# Patient Record
Sex: MALE | Race: WHITE | NOT HISPANIC OR LATINO
[De-identification: names, ages, dates, MRNs, and addresses within clinical notes are randomized per-mention and may not be internally consistent; named-entity substitution may affect disease eponyms.]

---

## 2021-09-15 PROBLEM — Z00.00 ENCOUNTER FOR PREVENTIVE HEALTH EXAMINATION: Status: ACTIVE | Noted: 2021-09-15

## 2021-09-23 ENCOUNTER — APPOINTMENT (OUTPATIENT)
Dept: SURGERY | Facility: CLINIC | Age: 51
End: 2021-09-23
Payer: MEDICAID

## 2021-09-23 VITALS — HEIGHT: 67 IN | BODY MASS INDEX: 31.39 KG/M2 | WEIGHT: 200 LBS

## 2021-09-23 PROCEDURE — 99203 OFFICE O/P NEW LOW 30 MIN: CPT

## 2021-09-23 NOTE — CONSULT LETTER
[Dear  ___] : Dear  [unfilled], [Courtesy Letter:] : I had the pleasure of seeing your patient, [unfilled], in my office today. [Please see my note below.] : Please see my note below. [Consult Closing:] : Thank you very much for allowing me to participate in the care of this patient.  If you have any questions, please do not hesitate to contact me. [DrCindy  ___] : Dr. PEREZ [FreeTextEntry3] : Respectfully,\par \par Damon Izquierdo M.D., FACS\par

## 2021-09-23 NOTE — PHYSICAL EXAM
[Normal Breath Sounds] : Normal breath sounds [No Rash or Lesion] : No rash or lesion [Alert] : alert [Calm] : calm [JVD] : no jugular venous distention  [de-identified] : overweight [de-identified] : normal [de-identified] : moderately protuberant abdomen, mild diastasis recti\par  [de-identified] : umbilical hernia, reducible

## 2021-09-23 NOTE — ASSESSMENT
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 He enjoys exercising and lifting weights at the gym. He presented today with his son who acted as , but he wasn't really necessary as Rodo and I were able to converse easily together.\par \par Physical examination demonstrates a strawberry size very tender bulge at the umbilicus which is reducible with a significant degree of difficulty consistent with a large symptomatic protruding umbilical hernia warranting surgical repair. There is no evidence of incarceration or strangulation, and the patient denies any symptoms of obstruction.  This hernia is complicated by a mild to moderate diastasis recti which is likely related to his excess abdominal weight. His current BMI is 31.\par \par I explained the pros and cons of surgery, as well as all risks, benefits, indications and alternatives of the procedure and the patient understood and agreed. Rodo was scheduled for the repair of his umbilical hernia with mesh on Friday, November 5, 2021 under LOCAL with IV SEDATION at [facility] with presurgical testing waived.  He was encouraged to avoid heavy lifting and strenuous activity in the interim, of course. He will hold his Humira 2 weeks prior to his procedure and may resume it 2 weeks thereafter at the direction of his rheumatologist.

## 2021-11-02 ENCOUNTER — OUTPATIENT (OUTPATIENT)
Dept: OUTPATIENT SERVICES | Facility: HOSPITAL | Age: 51
LOS: 1 days | Discharge: HOME | End: 2021-11-02

## 2021-11-02 ENCOUNTER — LABORATORY RESULT (OUTPATIENT)
Age: 51
End: 2021-11-02

## 2021-11-02 DIAGNOSIS — Z11.59 ENCOUNTER FOR SCREENING FOR OTHER VIRAL DISEASES: ICD-10-CM

## 2021-11-05 ENCOUNTER — APPOINTMENT (OUTPATIENT)
Dept: SURGERY | Facility: AMBULATORY SURGERY CENTER | Age: 51
End: 2021-11-05
Payer: MEDICAID

## 2021-11-05 ENCOUNTER — OUTPATIENT (OUTPATIENT)
Dept: OUTPATIENT SERVICES | Facility: HOSPITAL | Age: 51
LOS: 1 days | Discharge: HOME | End: 2021-11-05

## 2021-11-05 VITALS
DIASTOLIC BLOOD PRESSURE: 85 MMHG | WEIGHT: 199.96 LBS | SYSTOLIC BLOOD PRESSURE: 124 MMHG | HEIGHT: 67 IN | HEART RATE: 86 BPM | OXYGEN SATURATION: 100 % | TEMPERATURE: 98 F | RESPIRATION RATE: 18 BRPM

## 2021-11-05 VITALS
RESPIRATION RATE: 28 BRPM | OXYGEN SATURATION: 96 % | HEART RATE: 94 BPM | SYSTOLIC BLOOD PRESSURE: 143 MMHG | DIASTOLIC BLOOD PRESSURE: 100 MMHG

## 2021-11-05 DIAGNOSIS — Z98.890 OTHER SPECIFIED POSTPROCEDURAL STATES: Chronic | ICD-10-CM

## 2021-11-05 PROCEDURE — 49585: CPT

## 2021-11-05 RX ORDER — ONDANSETRON 8 MG/1
4 TABLET, FILM COATED ORAL ONCE
Refills: 0 | Status: DISCONTINUED | OUTPATIENT
Start: 2021-11-05 | End: 2021-11-19

## 2021-11-05 RX ORDER — ACETAMINOPHEN 500 MG
1000 TABLET ORAL ONCE
Refills: 0 | Status: COMPLETED | OUTPATIENT
Start: 2021-11-05 | End: 2021-11-05

## 2021-11-05 RX ORDER — CELECOXIB 200 MG/1
200 CAPSULE ORAL ONCE
Refills: 0 | Status: COMPLETED | OUTPATIENT
Start: 2021-11-05 | End: 2021-11-05

## 2021-11-05 RX ORDER — SODIUM CHLORIDE 9 MG/ML
1000 INJECTION, SOLUTION INTRAVENOUS
Refills: 0 | Status: DISCONTINUED | OUTPATIENT
Start: 2021-11-05 | End: 2021-11-19

## 2021-11-05 RX ORDER — HYDROMORPHONE HYDROCHLORIDE 2 MG/ML
1 INJECTION INTRAMUSCULAR; INTRAVENOUS; SUBCUTANEOUS
Refills: 0 | Status: DISCONTINUED | OUTPATIENT
Start: 2021-11-05 | End: 2021-11-05

## 2021-11-05 RX ORDER — ADALIMUMAB 40MG/0.8ML
40 KIT SUBCUTANEOUS
Qty: 0 | Refills: 0 | DISCHARGE

## 2021-11-05 RX ORDER — HYDROMORPHONE HYDROCHLORIDE 2 MG/ML
0.5 INJECTION INTRAMUSCULAR; INTRAVENOUS; SUBCUTANEOUS
Refills: 0 | Status: DISCONTINUED | OUTPATIENT
Start: 2021-11-05 | End: 2021-11-05

## 2021-11-05 RX ORDER — TRAMADOL HYDROCHLORIDE 50 MG/1
1 TABLET ORAL
Qty: 30 | Refills: 0
Start: 2021-11-05 | End: 2021-11-09

## 2021-11-05 RX ORDER — OXYCODONE HYDROCHLORIDE 5 MG/1
5 TABLET ORAL ONCE
Refills: 0 | Status: DISCONTINUED | OUTPATIENT
Start: 2021-11-05 | End: 2021-11-05

## 2021-11-05 RX ADMIN — Medication 1000 MILLIGRAM(S): at 10:55

## 2021-11-05 RX ADMIN — SODIUM CHLORIDE 100 MILLILITER(S): 9 INJECTION, SOLUTION INTRAVENOUS at 12:48

## 2021-11-05 RX ADMIN — CELECOXIB 200 MILLIGRAM(S): 200 CAPSULE ORAL at 10:56

## 2021-11-05 NOTE — ASU PREOP CHECKLIST - SURGICAL CONSENT
verified with  # [id number] that the patient is aware of surgery, and consent was signed in the office with son present./done

## 2021-11-05 NOTE — CHART NOTE - NSCHARTNOTEFT_GEN_A_CORE
PACU ANESTHESIA ADMISSION NOTE      Procedure: Umbilical hernia repair with mesh      Post op diagnosis:  Umbilical hernia        ____  Intubated  TV:______       Rate: ______      FiO2: ______    __x__  Patent Airway    __x__  Full return of protective reflexes    __x__  Full recovery from anesthesia / back to baseline status    Vitals:  T(C): 36.9 (11-05-21 @ 10:25), Max: 36.9 (11-05-21 @ 10:25)  HR: 86 (11-05-21 @ 10:25) (86 - 86)  BP: 124/85 (11-05-21 @ 10:25) (124/85 - 124/85)  RR: 18 (11-05-21 @ 10:25) (18 - 18)  SpO2: 100% (11-05-21 @ 10:25) (100% - 100%)    Mental Status:  __x__ Awake   ___x__ Alert   _____ Drowsy   _____ Sedated    Nausea/Vomiting:  __x__ NO  ______Yes,   See Post - Op Orders          Pain Scale (0-10):  _0____    Treatment: ____ None    __x__ See Post - Op/PCA Orders    Post - Operative Fluids:   ____ Oral   __x__ See Post - Op Orders    Plan: Discharge:   ___x_Home       _____Floor     _____Critical Care    _____  Other:_________________    Comments: Patient had smooth intraoperative event, no anesthesia complication.  PACU Vital signs: HR: 92           BP:   118     /  75        RR:  16           O2 Sat: 97      %     Temp 36

## 2021-11-05 NOTE — ASU DISCHARGE PLAN (ADULT/PEDIATRIC) - CARE PROVIDER_API CALL
Damon Izquierdo)  Surgery  [address]  [place] [address]  Phone: [phone]  Fax: [phone]  Scheduled Appointment: 11/16/2021

## 2021-11-11 DIAGNOSIS — K42.9 UMBILICAL HERNIA WITHOUT OBSTRUCTION OR GANGRENE: ICD-10-CM

## 2021-11-16 ENCOUNTER — APPOINTMENT (OUTPATIENT)
Dept: SURGERY | Facility: CLINIC | Age: 51
End: 2021-11-16
Payer: MEDICAID

## 2021-11-16 VITALS — WEIGHT: 210 LBS | HEIGHT: 67 IN | BODY MASS INDEX: 32.96 KG/M2

## 2021-11-16 DIAGNOSIS — K42.9 UMBILICAL HERNIA W/OUT OBSTRUCTION OR GANGRENE: ICD-10-CM

## 2021-11-16 PROCEDURE — 99024 POSTOP FOLLOW-UP VISIT: CPT

## 2021-11-16 RX ORDER — SULFAMETHOXAZOLE AND TRIMETHOPRIM 800; 160 MG/1; MG/1
800-160 TABLET ORAL
Qty: 14 | Refills: 1 | Status: ACTIVE | COMMUNITY
Start: 2021-11-16 | End: 1900-01-01

## 2021-11-16 NOTE — ASSESSMENT
[FreeTextEntry1] : Rodo underwent the repair of his large umbilical hernia with mesh on November 5, 2021 under local with IV sedation without any problems or complications. His wound is clean, dry and intact. There is no evidence of seroma formation or infection. He does have some very mild erythema in the umbilical stalk. He denies any fevers or chills and looks well. He is tolerating a diet and having normal bowel movements. He denies any significant postoperative pain or discomfort at this time.\par \par He was counseled and reassured. Rodo was discharged from the office with no specific followup necessary, but he knows to avoid any heavy lifting or strenuous activity for the next several weeks.  We also discussed the importance of calorie restriction and healthy eating with regard to weight loss, hernia recurrence and his overall health.He was encouraged to continue to wear his abdominal binder for the better part of the next month. \par \par In light of his perioperative Humira use and his very mild nontender erythema in the umbilical stalk itself, I prescribed one week of Bactrim DS  twice daily empirically. I will call him in 2 weeks to check on him and encouraged him to call me in the interim if any issues arise.

## 2021-11-16 NOTE — CONSULT LETTER
[FreeTextEntry1] : Dear Dr. Fadia Ochoa, \par \par I had the pleasure of seeing your patient, ESTEFANY HOWARD, in my office today. Please see my note below. \par \par Thank you very much for allowing me to participate in the care of this patient. If you have any questions, please do not hesitate to contact me. \par \par \par Respectfully,\par \par Damon Izquierdo M.D., FACS\par  \par \par \par \par cc: Dr. Ann Resendiz

## 2021-12-02 ENCOUNTER — NON-APPOINTMENT (OUTPATIENT)
Age: 51
End: 2021-12-02

## 2024-03-01 PROBLEM — M06.9 RHEUMATOID ARTHRITIS, UNSPECIFIED: Chronic | Status: ACTIVE | Noted: 2021-11-05

## 2024-03-22 ENCOUNTER — APPOINTMENT (OUTPATIENT)
Dept: CARDIOLOGY | Facility: CLINIC | Age: 54
End: 2024-03-22

## 2024-08-01 ENCOUNTER — EMERGENCY (EMERGENCY)
Facility: HOSPITAL | Age: 54
LOS: 0 days | Discharge: HOME | End: 2024-08-01
Attending: STUDENT IN AN ORGANIZED HEALTH CARE EDUCATION/TRAINING PROGRAM
Payer: MEDICAID

## 2024-08-01 VITALS
SYSTOLIC BLOOD PRESSURE: 122 MMHG | OXYGEN SATURATION: 97 % | RESPIRATION RATE: 16 BRPM | HEART RATE: 86 BPM | DIASTOLIC BLOOD PRESSURE: 73 MMHG

## 2024-08-01 VITALS
DIASTOLIC BLOOD PRESSURE: 69 MMHG | RESPIRATION RATE: 16 BRPM | TEMPERATURE: 98 F | WEIGHT: 195.11 LBS | HEIGHT: 67 IN | HEART RATE: 96 BPM | SYSTOLIC BLOOD PRESSURE: 128 MMHG | OXYGEN SATURATION: 97 %

## 2024-08-01 DIAGNOSIS — M06.9 RHEUMATOID ARTHRITIS, UNSPECIFIED: ICD-10-CM

## 2024-08-01 DIAGNOSIS — Z91.09 OTHER ALLERGY STATUS, OTHER THAN TO DRUGS AND BIOLOGICAL SUBSTANCES: ICD-10-CM

## 2024-08-01 DIAGNOSIS — L53.8 OTHER SPECIFIED ERYTHEMATOUS CONDITIONS: ICD-10-CM

## 2024-08-01 DIAGNOSIS — I83.891 VARICOSE VEINS OF RIGHT LOWER EXTREMITY WITH OTHER COMPLICATIONS: ICD-10-CM

## 2024-08-01 DIAGNOSIS — R60.0 LOCALIZED EDEMA: ICD-10-CM

## 2024-08-01 DIAGNOSIS — Z98.890 OTHER SPECIFIED POSTPROCEDURAL STATES: Chronic | ICD-10-CM

## 2024-08-01 DIAGNOSIS — M45.9 ANKYLOSING SPONDYLITIS OF UNSPECIFIED SITES IN SPINE: ICD-10-CM

## 2024-08-01 PROCEDURE — 93970 EXTREMITY STUDY: CPT | Mod: 26

## 2024-08-01 PROCEDURE — 93970 EXTREMITY STUDY: CPT

## 2024-08-01 PROCEDURE — 73590 X-RAY EXAM OF LOWER LEG: CPT | Mod: 26,RT

## 2024-08-01 PROCEDURE — 99285 EMERGENCY DEPT VISIT HI MDM: CPT

## 2024-08-01 PROCEDURE — 99284 EMERGENCY DEPT VISIT MOD MDM: CPT | Mod: 25

## 2024-08-01 PROCEDURE — 73590 X-RAY EXAM OF LOWER LEG: CPT | Mod: RT

## 2024-08-01 NOTE — ED PROVIDER NOTE - NSFOLLOWUPINSTRUCTIONS_ED_ALL_ED_FT
Insect Bite    An insect bite can make your skin red, itchy, and swollen. An insect bite is different from an insect sting, which happens when an insect injects poison (venom) into the skin.    Some insects can spread disease to people through a bite. However, most insect bites do not lead to disease and are not serious.    What are the causes?  Insects may bite for a variety of reasons, including:  - Hunger.  - To defend themselves.    Insects that bite include:  - Spiders.  - Mosquitoes and flies.  - Ticks and fleas.  - Ants.  - Kissing bugs.  - Chiggers.    What are the signs or symptoms?  In many cases, symptoms last for 2–4 days. However, itching can last up to 10 days. Symptoms include:  - Itching or pain in the bite area.  - Redness and swelling in the bite area.  - An open wound (skin ulcer).    In rare cases, a person may have a severe allergic reaction (anaphylactic reaction) to a bite. Symptoms of an anaphylactic reaction may include:  - Feeling warm in the face (flushed). This may include redness.  - Itchy, red, swollen areas of skin (hives).  - Swelling of the eyes, lips, face, mouth, tongue, or throat.  - Wheezing or difficulty breathing, speaking, or swallowing.  - Dizziness, light-headedness, or fainting.  - Abdominal symptoms like cramping, nausea, vomiting, or diarrhea.    How is this diagnosed?  This condition is usually diagnosed based on symptoms and a physical exam. During the exam, your health care provider will look at the bite and ask you what kind of insect bit you.    How is this treated?  Most insect bites are not serious. Symptoms often go away on their own and treatment is not usually needed. When treatment is recommended, it may include:  - Applying ice to the affected area.  - Applying steroid or other anti-itch creams, like calamine lotion, to the bite area.  - Medicines called antihistamines to reduce itching.    You may also need:  - A tetanus shot if you are not up to date.  - Antibiotic cream or an oral antibiotic if the bite becomes infected (this is uncommon).    Follow these instructions at home:    Bite area care    Do not scratch the bite area. It may help to cover the bite area with a bandage or close-fitting clothing.  Keep the bite area clean and dry. Wash it every day with soap and water as told by your health care provider.  Check the bite area every day for signs of infection. Check for:  - More redness, swelling, or pain.  - Fluid or blood.  - Warmth.  - Pus or a bad smell.    Managing pain, itching, and swelling    You may apply cortisone cream, calamine lotion, or a paste made of baking soda and water to the bite area as told by your health care provider.  If directed, put ice on the bite area. To do this:  - Put ice in a plastic bag.  - Place a towel between your skin and the bag.  - Leave the ice on for 20 minutes, 2–3 times a day.  If your skin turns bright red, remove the ice right away to prevent skin damage. The risk of skin damage is higher if you cannot feel pain, heat, or cold.    General instructions    Apply or take over-the-counter and prescription medicine only as told by your health care provider.  If you were prescribed antibiotics, take or apply them as told by your health care provider. Do not stop using the antibiotic even if you start to feel better.    How is this prevented?  To help reduce your risk of insect bites:  When you are outdoors, wear clothing that covers your arms and legs. This is especially important in the early morning and evening.  Use insect repellent. The best insect repellents contain DEET, picaridin, oil of lemon eucalyptus (OLE), or LM1614.  Consider spraying your clothing with a pesticide called permethrin. Permethrin helps prevent insect bites. It works for several weeks and for up to 5–6 clothing washes. Do not apply permethrin directly to the skin.  If your home windows do not have screens, consider installing them.  If you will be sleeping in an area where there are mosquitoes, consider covering your sleeping area with a mosquito net.    Contact a health care provider if:  - Your bite area has signs of infection, such as:  - More redness, swelling, or pain.  - Fluid or blood.  - Warmth.  - Pus or a bad smell.  - You have a fever.    Get help right away if:  - You have a rash.  - You have muscle or joint pain.  - You feel unusually tired or weak.  - You have neck pain or a headache.  - You develop symptoms of an anaphylactic reaction. These may include:      - Swelling of the eyes, lips, face, mouth, tongue, or throat.      - Flushed skin or hives.      - Wheezing.      - Difficulty breathing, speaking, or swallowing.      - Dizziness, light-headedness, or fainting.      - Abdominal pain, cramping, vomiting, or diarrhea.  These symptoms may be an emergency. Get help right away. Call 911.  Do not wait to see if the symptoms will go away.  Do not drive yourself to the hospital.    Summary    An insect bite can make your skin red, itchy, and swollen.  Treatment is usually not needed. Symptoms often go away on their own. When treatment is recommended, it may involve taking medicine, applying medicine to the area, or applying ice.  Apply or take over-the-counter and prescription medicines only as told by your health care provider.  Use insect repellent to help prevent insect bites.  Contact a health care provider if your bite area has signs of infection.  This information is not intended to replace advice given to you by your health care provider. Make sure you discuss any questions you have with your health care provider. Our Emergency Department Referral Coordinators will be reaching out to you in the next 24-48 hours from 9:00am to 5:00pm to schedule a follow up appointment. Please expect a phone call from the hospital in that time frame. If you do not receive a call or if you have any questions or concerns, you can reach them at   [phone].    Varicose Veins  Varicose veins are veins that have become enlarged, bulged, and twisted. They most often appear in the legs.    What are the causes?  This condition is caused by damage to the valves in the vein. These valves help blood return to your heart. When they are damaged and they stop working properly, blood may flow backward and back up in the veins near the skin, causing the veins to get larger and appear twisted.    The condition can result from any issue that causes blood to back up, like pregnancy, prolonged standing, or obesity.    What increases the risk?  The following factors may make you more likely to develop this condition:  Being on your feet a lot.  Being pregnant.  Being overweight.  Smoking.  Having had a previous deep vein thrombosis or having a thrombotic disorder.  Aging. The risk increases with age.  Having a condition called Klippel–Trenaunay syndrome.  What are the signs or symptoms?  Symptoms of this condition include:  Bulging, twisted, and bluish veins.  A feeling of heaviness in your legs. This may be worse at the end of the day.  Leg pain. This may be worse at the end of the day.  Swelling in the leg.  Changes in skin color over the veins.  Swelling or pain in the legs can limit your activities. Your symptoms may get worse when you sit or stand for long periods of time.    How is this diagnosed?  This condition may be diagnosed based on:  Your symptoms, family history, activity levels, and lifestyle.  A physical exam.  You may also have tests, including an ultrasound or X-ray.    How is this treated?  Treatment for this condition may involve:  Avoiding sitting or standing in one position for long periods of time.  Wearing compression stockings. These stockings help to prevent blood clots and reduce swelling in the legs.  Raising (elevating) the legs when resting.  Losing weight.  Exercising regularly.  If you have persistent symptoms or want to improve the way your varicose veins look, you may choose to have a procedure to close the varicose veins off or to remove them.    Nonsurgical treatments to close off the veins include:  Sclerotherapy. In this treatment, a solution is injected into a vein to close it off.  Laser treatment. The vein is heated with a laser to close it off.  Radiofrequency vein ablation. An electrical current produced by radio waves is used to close off the vein.  Surgical treatments to remove the veins include:  Phlebectomy. In this procedure, the veins are removed through small incisions made over the veins.  Vein ligation and stripping. In this procedure, incisions are made over the veins. The veins are then removed after being tied (ligated) with stitches (sutures).  Follow these instructions at home:  Medicines    Take over-the-counter and prescription medicines only as told by your health care provider.  If you were prescribed an antibiotic medicine, use it as told by your health care provider. Do not stop using the antibiotic even if you start to feel better.  Activity    Walk as much as possible. Walking increases blood flow. This helps blood return to the heart and takes pressure off your veins.  Do not stand or sit in one position for a long period of time.  Do not sit with your legs crossed.  Avoid sitting for a long time without moving. Get up to take short walks every 1–2 hours. This is important to improve blood flow and breathing. Ask for help if you feel weak or unsteady.  Return to your normal activities as told by your health care provider. Ask your health care provider what activities are safe for you.  Do exercises as told by your health care provider.  General instructions    Compression stockings on a person's lower legs.  Follow any diet instructions given to you by your health care provider.  Elevate your legs at night to above the level of your heart.  If you get a cut in the skin over the varicose vein and the vein bleeds:  Lie down with your leg raised.  Apply firm pressure to the cut with a clean cloth until the bleeding stops.  Place a bandage (dressing) on the cut.  Drink enough fluid to keep your urine pale yellow.  Do not use any products that contain nicotine or tobacco. These products include cigarettes, chewing tobacco, and vaping devices, such as e-cigarettes. If you need help quitting, ask your health care provider.  Wear compression stockings as told by your health care provider. Do not wear other kinds of tight clothing around your legs, pelvis, or waist.  Keep all follow-up visits. This is important.  Contact a health care provider if:  The skin around your varicose veins starts to break down.  You have more pain, redness, tenderness, or hard swelling over a vein.  You are uncomfortable because of pain.  You get a cut in the skin over a varicose vein and it will not stop bleeding.  Get help right away if:  You have chest pain.  You have trouble breathing.  You have severe leg pain.  Summary  Varicose veins are veins that have become enlarged, bulged, and twisted. They most often appear in the legs.  This condition is caused by damage to the valves in the vein. These valves help blood return to your heart.  Treatment for this condition includes frequent movements, wearing compression stockings, losing weight, and exercising regularly. In some cases, procedures are done to close off or remove the veins.  Nonsurgical treatments to close off the veins include sclerotherapy, laser therapy, and radiofrequency vein ablation.  This information is not intended to replace advice given to you by your health care provider. Make sure you discuss any questions you have with your health care provider.  Insects that bite include:  - Spiders.  - Mosquitoes and flies.  - Ticks and fleas.  - Ants.  - Kissing bugs.  - Chiggers.    What are the signs or symptoms?  In many cases, symptoms last for 2–4 days. However, itching can last up to 10 days. Symptoms include:  - Itching or pain in the bite area.  - Redness and swelling in the bite area.  - An open wound (skin ulcer).    In rare cases, a person may have a severe allergic reaction (anaphylactic reaction) to a bite. Symptoms of an anaphylactic reaction may include:  - Feeling warm in the face (flushed). This may include redness.  - Itchy, red, swollen areas of skin (hives).  - Swelling of the eyes, lips, face, mouth, tongue, or throat.  - Wheezing or difficulty breathing, speaking, or swallowing.  - Dizziness, light-headedness, or fainting.  - Abdominal symptoms like cramping, nausea, vomiting, or diarrhea.    How is this diagnosed?  This condition is usually diagnosed based on symptoms and a physical exam. During the exam, your health care provider will look at the bite and ask you what kind of insect bit you.    How is this treated?  Most insect bites are not serious. Symptoms often go away on their own and treatment is not usually needed. When treatment is recommended, it may include:  - Applying ice to the affected area.  - Applying steroid or other anti-itch creams, like calamine lotion, to the bite area.  - Medicines called antihistamines to reduce itching.    You may also need:  - A tetanus shot if you are not up to date.  - Antibiotic cream or an oral antibiotic if the bite becomes infected (this is uncommon).    Follow these instructions at home:    Bite area care    Do not scratch the bite area. It may help to cover the bite area with a bandage or close-fitting clothing.  Keep the bite area clean and dry. Wash it every day with soap and water as told by your health care provider.  Check the bite area every day for signs of infection. Check for:  - More redness, swelling, or pain.  - Fluid or blood.  - Warmth.  - Pus or a bad smell.    Managing pain, itching, and swelling    You may apply cortisone cream, calamine lotion, or a paste made of baking soda and water to the bite area as told by your health care provider.  If directed, put ice on the bite area. To do this:  - Put ice in a plastic bag.  - Place a towel between your skin and the bag.  - Leave the ice on for 20 minutes, 2–3 times a day.  If your skin turns bright red, remove the ice right away to prevent skin damage. The risk of skin damage is higher if you cannot feel pain, heat, or cold.    General instructions    Apply or take over-the-counter and prescription medicine only as told by your health care provider.  If you were prescribed antibiotics, take or apply them as told by your health care provider. Do not stop using the antibiotic even if you start to feel better.    How is this prevented?  To help reduce your risk of insect bites:  When you are outdoors, wear clothing that covers your arms and legs. This is especially important in the early morning and evening.  Use insect repellent. The best insect repellents contain DEET, picaridin, oil of lemon eucalyptus (OLE), or [id number].  Consider spraying your clothing with a pesticide called permethrin. Permethrin helps prevent insect bites. It works for several weeks and for up to 5–6 clothing washes. Do not apply permethrin directly to the skin.  If your home windows do not have screens, consider installing them.  If you will be sleeping in an area where there are mosquitoes, consider covering your sleeping area with a mosquito net.    Contact a health care provider if:  - Your bite area has signs of infection, such as:  - More redness, swelling, or pain.  - Fluid or blood.  - Warmth.  - Pus or a bad smell.  - You have a fever.    Get help right away if:  - You have a rash.  - You have muscle or joint pain.  - You feel unusually tired or weak.  - You have neck pain or a headache.  - You develop symptoms of an anaphylactic reaction. These may include:      - Swelling of the eyes, lips, face, mouth, tongue, or throat.      - Flushed skin or hives.      - Wheezing.      - Difficulty breathing, speaking, or swallowing.      - Dizziness, light-headedness, or fainting.      - Abdominal pain, cramping, vomiting, or diarrhea.  These symptoms may be an emergency. Get help right away. Call 911.  Do not wait to see if the symptoms will go away.  Do not drive yourself to the hospital.    Summary    An insect bite can make your skin red, itchy, and swollen.  Treatment is usually not needed. Symptoms often go away on their own. When treatment is recommended, it may involve taking medicine, applying medicine to the area, or applying ice.  Apply or take over-the-counter and prescription medicines only as told by your health care provider.  Use insect repellent to help prevent insect bites.  Contact a health care provider if your bite area has signs of infection.  This information is not intended to replace advice given to you by your health care provider. Make sure you discuss any questions you have with your health care provider.

## 2024-08-01 NOTE — ED PROVIDER NOTE - CLINICAL SUMMARY MEDICAL DECISION MAKING FREE TEXT BOX
Pt here with atraumatic redness/swelling/erythema to R distal anterior shin. Findings suspicious for insect bite vs superficial thrombophlebitis. Less likely DVT given location of swelling not in distribution of DVT. Exam not suggestive of erythema nodosum. No fluctuance concerning for abscess. Plan for imaging r/o DVT, superficial thrombophlebitis, fx, dislocation. Xray negative. Duplex prelim read with thrombosed anterior varicose vein per US tech, no DVT. Discussed supportive care and provided reassurance. Considered observation vs admission however pt is a good candidate for d/c home with outpatient f/u given that no life threatening pathology found in ED and pt has close outpatient f/u. Strict ED return precautions given. Pt verbalized understanding and was agreeable with plan.

## 2024-08-01 NOTE — ED PROVIDER NOTE - PATIENT PORTAL LINK FT
You can access the FollowMyHealth Patient Portal offered by [facility] by registering at the following website: [email or web address]. By joining [employer]’s FollowMyHealth portal, you will also be able to view your health information using other applications (apps) compatible with our system.

## 2024-08-01 NOTE — ED PROVIDER NOTE - OBJECTIVE STATEMENT
53y male with PMHx of RA, ankylosing spondylitis who presents for right anterior shin erythema x 3 days. 53y male with PMHx of RA, ankylosing spondylitis who presents for right anterior shin erythema x 3 days. He noticed a small area of nontender edema and erythema on his right anterior lower leg. He went to his PMD, who recommended he go to the ER to rule out DVT. No fever, chills, lethargy, or abnormal weight loss, headache, vision changes, chest pain, SOB, palpitations, n/v/d, abdominal pain, dysuria, hematuria, weakness, numbness, falls, recent travel, known sick contacts. Denies alcohol use, substance use, tobacco use.

## 2024-08-01 NOTE — ED ADULT NURSE NOTE - NSFALLUNIVINTERV_ED_ALL_ED
Bed/Stretcher in lowest position, wheels locked, appropriate side rails in place/Call bell, personal items and telephone in reach/Instruct patient to call for assistance before getting out of bed/chair/stretcher/Non-slip footwear applied when patient is off stretcher/[place] to call system/Physically safe environment - no spills, clutter or unnecessary equipment/Purposeful proactive rounding/Room/bathroom lighting operational, light cord in reach

## 2024-08-01 NOTE — ED PROVIDER NOTE - CARE PROVIDER_API CALL
Tanvir Elam  Internal Medicine  [address] [place] [id number]  Phone: [phone]  Fax: [phone]  Follow Up Time: Routine

## 2024-08-01 NOTE — ED PROVIDER NOTE - ATTENDING CONTRIBUTION TO CARE
52 yo M with hx of RA on xeljanz, ankylosing spondylitis who presents with redness, swelling, itching, pain to R lower anterior shin x4-5 days. Pt says he has had pain to area for past 1 mo but the redness/swelling are new. No known trauma. [place]. No one else at home with similar sx. No fever, chills. Went to PMD who sent in to r/o DVT. Pt has hx of varicose veins but no hx of DVT.    PMD Dr. Elam    CONSTITUTIONAL: well developed, nontoxic appearing, in no acute distress, speaking in full sentences  SKIN: warm, dry, mild nodular areas of erythema to R distal anterior shin, no fluctuance, no vesicles   HEENT: normocephalic, no conjunctival erythema, moist mucous membranes, patent airway  NECK: supple  CV:  regular rate  RESP: normal work of breathing  ABD: nondistended  MSK: moves all extremities, no cyanosis, no edema, no calf tenderness, normal extension/flexion of hip, normal extension/flexion of knees, normal dorsiflexion/plantarflexion of ankle, 2+ DP pulses, sensation intact to touch, cap refill <2 seconds, normal gait  NEURO: alert, oriented, grossly unremarkable  PSYCH: cooperative, appropriate    A&P:  Pt here with atraumatic redness/swelling/erythema to R distal anterior shin. Findings suspicious for insect bite vs superficial thrombophlebitis. Less likely DVT given location of swelling not in distribution of DVT. Exam not suggestive of erythema nodosum. No fluctuance concerning for abscess. Plan for imaging r/o DVT, superficial thrombophlebitis, fx, dislocation.

## 2024-08-01 NOTE — ED PROVIDER NOTE - PHYSICAL EXAMINATION
VITAL SIGNS: I have reviewed nursing notes and confirm.  CONSTITUTIONAL: well-appearing, non-toxic, NAD  SKIN: Warm dry, normal skin turgor, no acute rash, no bruising  HEAD: NCAT  EYES: EOMI, PERRLA, no scleral icterus, normal conjunctiva  ENT: Moist mucous membranes, OR clear. Normal pharynx  NECK: Supple; non tender. Full ROM. No cervical LAD  CARD: RRR, no murmurs, rubs or gallops  RESP: clear to ausculation b/l.  No rales, rhonchi, or wheezing. No increased WOB.  ABD: soft, + BS, non-tender, non-distended, no rebound or guarding. No CVA tenderness  EXT: Full ROM, no bony tenderness, pulses intact in bilateral UE and LE, no pedal edema, right anterior lower leg with a small raised area with erythema in the pattern of a varicose vein, no calf tenderness  NEURO: normal motor. normal sensory. Normal gait.  PSYCH: Cooperative, appropriate.

## 2024-08-01 NOTE — ED ADULT NURSE NOTE - MODE OF DISCHARGE
Deviated nasal septum    Dyslipidemia    Hypertension    Prolactinoma    Type 2 diabetes mellitus
Ambulatory

## 2024-10-21 ENCOUNTER — EMERGENCY (EMERGENCY)
Facility: HOSPITAL | Age: 54
LOS: 0 days | Discharge: HOME | End: 2024-10-21
Attending: STUDENT IN AN ORGANIZED HEALTH CARE EDUCATION/TRAINING PROGRAM
Payer: MEDICAID

## 2024-10-21 VITALS
WEIGHT: 199.96 LBS | OXYGEN SATURATION: 98 % | HEART RATE: 81 BPM | DIASTOLIC BLOOD PRESSURE: 91 MMHG | TEMPERATURE: 98 F | SYSTOLIC BLOOD PRESSURE: 125 MMHG | RESPIRATION RATE: 17 BRPM

## 2024-10-21 DIAGNOSIS — M06.9 RHEUMATOID ARTHRITIS, UNSPECIFIED: ICD-10-CM

## 2024-10-21 DIAGNOSIS — Z98.890 OTHER SPECIFIED POSTPROCEDURAL STATES: Chronic | ICD-10-CM

## 2024-10-21 DIAGNOSIS — Z91.040 LATEX ALLERGY STATUS: ICD-10-CM

## 2024-10-21 DIAGNOSIS — M79.89 OTHER SPECIFIED SOFT TISSUE DISORDERS: ICD-10-CM

## 2024-10-21 DIAGNOSIS — L03.116 CELLULITIS OF LEFT LOWER LIMB: ICD-10-CM

## 2024-10-21 PROCEDURE — 99284 EMERGENCY DEPT VISIT MOD MDM: CPT | Mod: 25

## 2024-10-21 PROCEDURE — 93970 EXTREMITY STUDY: CPT | Mod: 26

## 2024-10-21 PROCEDURE — 99284 EMERGENCY DEPT VISIT MOD MDM: CPT

## 2024-10-21 PROCEDURE — 93970 EXTREMITY STUDY: CPT

## 2024-10-21 RX ORDER — DOXYCYCLINE HYCLATE 100 MG
100 CAPSULE ORAL ONCE
Refills: 0 | Status: COMPLETED | OUTPATIENT
Start: 2024-10-21 | End: 2024-10-21

## 2024-10-21 RX ORDER — DOXYCYCLINE HYCLATE 100 MG
1 CAPSULE ORAL
Qty: 14 | Refills: 0
Start: 2024-10-21 | End: 2024-10-27

## 2024-10-21 RX ADMIN — Medication 100 MILLIGRAM(S): at 23:01

## 2024-10-21 NOTE — ED PROVIDER NOTE - PHYSICAL EXAMINATION
Physical Exam    Vital Signs: I have reviewed the initial vital signs.  Constitutional: well-nourished, appears stated age, no acute distress  Musculoskeletal: supple neck, + left lower extremity edema, no midline tenderness  Integumentary: warm, dry, + left lower leg redness warmth tenderness nad mild swelling to area, gait stable   Neurologic: awake, alert,  extremities’ motor and sensory functions grossly intact  Psychiatric: appropriate mood, appropriate affect

## 2024-10-21 NOTE — ED PROVIDER NOTE - PATIENT PORTAL LINK FT
You can access the FollowMyHealth Patient Portal offered by [facility] by registering at the following website: [email or web address]. By joining [employer]’s FollowMyHealth portal, you will also be able to view your health information using other applications (apps) compatible with our system.

## 2024-10-21 NOTE — ED ADULT NURSE NOTE - NSFALLUNIVINTERV_ED_ALL_ED
Bed/Stretcher in lowest position, wheels locked, appropriate side rails in place/Call bell, personal items and telephone in reach/Instruct patient to call for assistance before getting out of bed/chair/stretcher/Non-slip footwear applied when patient is off stretcher/[place] to call system/Physically safe environment - no spills, clutter or unnecessary equipment/Purposeful proactive rounding/Room/bathroom lighting operational, light cord in reach

## 2024-10-21 NOTE — ED PROVIDER NOTE - OBJECTIVE STATEMENT
54 year old male past medical history of RA comes to emergency room for left lower leg redness and swelling. patient seen a few day ago after hitting and states had normal xray. patient states over the last 4 days increasing redness swelling and warmth to area. no fever/chills no calf pain no shortness of breath no throat pain .

## 2024-10-21 NOTE — ED PROVIDER NOTE - CLINICAL SUMMARY MEDICAL DECISION MAKING FREE TEXT BOX
Patient with cellulitis of the left lower extremity DVT study negative will discharge    Independent interpretation of the DUPLEX performed by: Ashutosh  No DVT    Appropriate medications for patient's presenting complaints were ordered and effects were reassessed. Patient's external records were reviewed    Escalation to admission and/or observation was considered.  Patient feels much better and is comfortable with discharge.  Appropriate follow-up was arranged.

## 2024-10-21 NOTE — ED PROVIDER NOTE - NSFOLLOWUPINSTRUCTIONS_ED_ALL_ED_FT
Follow up with your primary care doctor in 1-2 days     Cellulitis    WHAT YOU NEED TO KNOW:    Cellulitis is a skin infection caused by bacteria. Cellulitis may go away on its own or you may need treatment. Your healthcare provider may draw a [place] around the outside edges of your cellulitis. If your cellulitis spreads, your healthcare provider will see it outside of the [place]. Cellulitis          DISCHARGE INSTRUCTIONS:    Call 911 if:     You have sudden trouble breathing or chest pain.        Return to the emergency department if:     Your wound gets larger and more painful.       You feel a crackling under your skin when you touch it.      You have purple dots or bumps on your skin, or you see bleeding under your skin.      You have new swelling and pain in your legs.      The red, warm, swollen area gets larger.      You see red streaks coming from the infected area.    Contact your healthcare provider if:     You have a fever.      Your fever or pain does not go away or gets worse.      The area does not get smaller after 2 days of antibiotics.      Your skin is flaking or peeling off.      You have questions or concerns about your condition or care.    Medicines:     Antibiotics help treat the bacterial infection.       NSAIDs, such as ibuprofen, help decrease swelling, pain, and fever. NSAIDs can cause stomach bleeding or kidney problems in certain people. If you take blood thinner medicine, always ask if NSAIDs are safe for you. Always read the medicine label and follow directions. Do not give these medicines to children under 6 months of age without direction from your child's healthcare provider.      Acetaminophen decreases pain and fever. It is available without a doctor's order. Ask how much to take and how often to take it. Follow directions. Read the labels of all other medicines you are using to see if they also contain acetaminophen, or ask your doctor or pharmacist. Acetaminophen can cause liver damage if not taken correctly. Do not use more than 4 grams (4,000 milligrams) total of acetaminophen in one day.       Take your medicine as directed. Contact your healthcare provider if you think your medicine is not helping or if you have side effects. Tell him or her if you are allergic to any medicine. Keep a list of the medicines, vitamins, and herbs you take. Include the amounts, and when and why you take them. Bring the list or the pill bottles to follow-up visits. Carry your medicine list with you in case of an emergency.    Self-care:     Elevate the area above the level of your heart as often as you can. This will help decrease swelling and pain. Prop the area on pillows or blankets to keep it elevated comfortably.       Clean the area daily until the wound scabs over. Gently wash the area with soap and water. Pat dry. Use dressings as directed.       Place cool or warm, wet cloths on the area as directed. Use clean cloths and clean water. Leave it on the area until the cloth is room temperature. Pat the area dry with a clean, dry cloth. The cloths may help decrease pain.     Prevent cellulitis:     Do not scratch bug bites or areas of injury. You increase your risk for cellulitis by scratching these areas.       Do not share personal items, such as towels, clothing, and razors.       Clean exercise equipment with germ-killing  before and after you use it.      Wash your hands often. Use soap and water. Wash your hands after you use the bathroom, change a child's diapers, or sneeze. Wash your hands before you prepare or eat food. Use lotion to prevent dry, cracked skin. Handwashing           Wear pressure stockings as directed. You may be told to wear the stockings if you have peripheral edema. The stockings improve blood flow and decrease swelling.      Treat athlete’s foot. This can help prevent the spread of a bacterial skin infection.    Follow up with your healthcare provider within 3 days, or as directed: Your healthcare provider will check if your cellulitis is getting better. You may need different medicine. Write down your questions so you remember to ask them during your visits.       © Copyright [employer] 2019 All illustrations and images included in CareNotes are the copyrighted property of [employer] or [employer]

## 2024-10-21 NOTE — ED ADULT NURSE NOTE - OBJECTIVE STATEMENT
pt in er for complaints of pain and swelling to left lower leg   pt stated 1 week ago he hit his leg outside on concrete when he fell  pt stated he had xrays done, no broken bones

## 2024-10-22 PROBLEM — M45.9 ANKYLOSING SPONDYLITIS OF UNSPECIFIED SITES IN SPINE: Chronic | Status: ACTIVE | Noted: 2024-08-01
